# Patient Record
Sex: FEMALE
[De-identification: names, ages, dates, MRNs, and addresses within clinical notes are randomized per-mention and may not be internally consistent; named-entity substitution may affect disease eponyms.]

---

## 2020-05-11 ENCOUNTER — NURSE TRIAGE (OUTPATIENT)
Dept: OTHER | Facility: CLINIC | Age: 25
End: 2020-05-11

## 2020-05-11 NOTE — TELEPHONE ENCOUNTER
Reason for Disposition   Shoulder pain    Answer Assessment - Initial Assessment Questions  1. ONSET: \"When did the pain start? \"      5 hours  2. LOCATION: \"Where is the pain located? \"      Left shoulder, around the joint  3. PAIN: \"How bad is the pain? \" (Scale 1-10; or mild, moderate, severe)    - MILD (1-3): doesn't interfere with normal activities    - MODERATE (4-7): interferes with normal activities (e.g., work or school) or awakens from sleep    - SEVERE (8-10): excruciating pain, unable to do any normal activities, unable to move arm at all due to pain      moderate  4. WORK OR EXERCISE: \"Has there been any recent work or exercise that involved this part of the body? \"      no  5. CAUSE: \"What do you think is causing the shoulder pain? \"      no  6. OTHER SYMPTOMS: \"Do you have any other symptoms? \" (e.g., neck pain, swelling, rash, fever, numbness, weakness)      no  7. PREGNANCY: \"Is there any chance you are pregnant? \" \"When was your last menstrual period? \"      Around 10 weeks    Protocols used: SHOULDER PAIN-ADULT-    Patient and patient's family member given care advice instructions per protocol, patient and patient's family member verbalized understanding